# Patient Record
Sex: FEMALE | Race: WHITE | HISPANIC OR LATINO | URBAN - METROPOLITAN AREA
[De-identification: names, ages, dates, MRNs, and addresses within clinical notes are randomized per-mention and may not be internally consistent; named-entity substitution may affect disease eponyms.]

---

## 2017-06-30 ENCOUNTER — ALLSCRIPTS OFFICE VISIT (OUTPATIENT)
Dept: OTHER | Facility: OTHER | Age: 47
End: 2017-06-30

## 2018-01-12 VITALS
OXYGEN SATURATION: 98 % | DIASTOLIC BLOOD PRESSURE: 104 MMHG | HEART RATE: 62 BPM | BODY MASS INDEX: 31.74 KG/M2 | WEIGHT: 197.5 LBS | HEIGHT: 66 IN | SYSTOLIC BLOOD PRESSURE: 140 MMHG

## 2018-01-15 NOTE — RESULT NOTES
Verified Results  (1) URINALYSIS w URINE C/S REFLEX (will reflex a microscopy if leukocytes, occult blood, or nitrites are not within normal limits) 27Apr2016 10:02AM Isidro Segal    Order Number: [id number]    TW Order Number: [id number]     Test Name Result Flag Reference   COLOR Yellow     CLARITY Turbid     PH UA 6 0  4 5-8 0   LEUKOCYTE ESTERASE UA Large A Negative   NITRITE UA Negative  Negative   PROTEIN UA Trace mg/dl A Negative   GLUCOSE UA Negative mg/dl  Negative   KETONES UA Negative mg/dl  Negative   UROBILINOGEN UA 0 2 E U /dl  0 2, 1 0 E U /dl   BILIRUBIN UA Negative  Negative   BLOOD UA Small A Negative   SPECIFIC GRAVITY UA 1 028  1 003-1 030   BACTERIA Occasional /hpf  None Seen, Occasional   EPITHELIAL CELLS Moderate /hpf A None Seen, Occasional   HYALINE CASTS 5-10 /lpf A None Seen   RBC UA 2-4 /hpf A None Seen   WBC UA Innumerable /hpf A None Seen

## 2018-03-07 NOTE — PROGRESS NOTES
History of Present Illness    Revaccination   Vaccine Information: Vaccine(s) Given (names): Jo De León [id number]  Spoke with patient regarding vaccine out of temperature range  Action(s): Revaccination declined  Appointment scheduled: [id number] sd  Other Information: spoke with patient and schedule appt [phone]  [id number] sd  after no show we called patient now refuses revaccination  No Show Appt(s): [id number]  Active Problems    1  Abdominal pain, acute, right lower quadrant (789 03,338 19) (R10 31)   2  Acid reflux (530 81) (K21 9)   3  Acute UTI (599 0) (N39 0)   4  Cervical radiculopathy (723 4) (M54 12)   5  Cervicalgia (723 1) (M54 2)   6  Chest heaviness (786 59) (R07 89)   7  Dysuria (788 1) (R30 0)   8  Essential hypertension (401 9) (I10)   9  Headache (784 0) (R51)   10  Lightheadedness (780 4) (R42)   11  Low back pain (724 2) (M54 5)   12  Lumbar radiculopathy (724 4) (M54 16)   13  Muscle spasms of lower extremity (728 85) (M62 838)   14  Need for revaccination (V05 9) (Z23)   15  Obesity (278 00) (E66 9)   16  Palpitations (785 1) (R00 2)   17  Paroxysmal atrial fibrillation (427 31) (I48 0)   18  Sciatica (724 3) (M54 30)   19  Syncope, unspecified syncope type (780 2) (R55)   20  Tiredness (780 79) (R53 83)   21  Tonsillolith (474 8) (J35 8)    Immunizations  Influenza --- Kemi Ting: Temporarily Deferred: Pt refuses, As of: [id number], Defer for 1 Years   Tdap --- Series1: 16-Sep-2015     Current Meds   1  Aspirin EC 81 MG Oral Tablet Delayed Release; TAKE 1 TABLET DAILY   2  Cyclobenzaprine HCl - 10 MG Oral Tablet; TAKE 1 po qhs, prn  pain   3  Lisinopril 20 MG Oral Tablet; TAKE 1 TABLET BY MOUTH DAILY AS DIRECTED   4  Naproxen 500 MG Oral Tablet; TAKE 1 TABLET EVERY 12 HOURS AS NEEDED   5  Nitrofurantoin Monohyd Macro 100 MG Oral Capsule; TAKE 1 CAPSULE TWICE DAILY   UNTIL GONE   6  Triamterene-HCTZ 37 5-25 MG Oral Tablet; Take 1 tablet daily    Allergies    1   Cipro TABS    Signatures   Electronically signed by : HEIDY Wiggins ; Feb 17 2017  7:30AM EST

## 2018-03-12 NOTE — PROCEDURES
Current Meds  1  Cyclobenzaprine HCl - 10 MG Oral Tablet; TAKE 1 po qhs, prn  pain  Requested for:   36DAC3371; Last Rx:[id number] Ordered  2  Lisinopril 20 MG Oral Tablet; TAKE 1 TABLET BY MOUTH DAILY AS DIRECTED; Therapy: 40WPH0034 to (Velasquez Calderon)  Requested for: 90VDY2669; Last   Rx:[id number] Ordered  3  Naproxen 500 MG Oral Tablet (Naprosyn); TAKE 1 TABLET EVERY 12 HOURS AS   NEEDED  Requested for: 87Eut1122; Last Rx:75Pby0157; Status: ACTIVE - Renewal   Denied Ordered  4  Triamterene-HCTZ 37 5-25 MG Oral Tablet; Take 1 tablet daily; Therapy: 85HBJ2389 to (Evaluate:45Qqe9743)  Requested for: 66Xvz9478; Last   Rx:77Vut1040 Ordered    Allergies   1  Cipro TABS    Procedure    Procedure: Kaylene Chinchilla was assessed with a Holter monitor  Indication: palpitations  Symptoms during recording : Did not return diary  Duration: approximately 24 hours  Findings:   Premature atrial contractions: occasional    1  1  1   Premature ventricular contractions: rare  Referred by Dr Yahir Elliott  3  PVCs 18 PACs  One supraventricular couplet and a 3 beat run of SVT were noted  No periods of bradycardia or pauses noted  Patient did not return symptom diary  Complications:  the quality of the study allowed for adequate interpretation  1 Amended By: Jagdish Olivo; Jul 31 2016 11:40 PM EST    Future Appointments    Date/Time Provider Specialty Site   08/09/2016 08:30 AM HEIDY Miller   Cardiology [facility]   Electronically signed by : HEIDY Carrington ; Jul 31 2016 11:40PM EST                       (Author)

## 2019-01-14 ENCOUNTER — TELEPHONE (OUTPATIENT)
Dept: INTERNAL MEDICINE CLINIC | Facility: CLINIC | Age: 49
End: 2019-01-14

## 2019-02-07 NOTE — TELEPHONE ENCOUNTER
Patient no longer lives in [place] is now living in [place]  Medical records have been requested by [facility], [place]  Have been printed and put in today's mail